# Patient Record
Sex: FEMALE | Race: WHITE | URBAN - METROPOLITAN AREA
[De-identification: names, ages, dates, MRNs, and addresses within clinical notes are randomized per-mention and may not be internally consistent; named-entity substitution may affect disease eponyms.]

---

## 2017-04-25 ENCOUNTER — OUTPATIENT (OUTPATIENT)
Dept: OUTPATIENT SERVICES | Facility: HOSPITAL | Age: 72
LOS: 1 days | Discharge: HOME | End: 2017-04-25

## 2017-04-28 DIAGNOSIS — M81.0 AGE-RELATED OSTEOPOROSIS WITHOUT CURRENT PATHOLOGICAL FRACTURE: ICD-10-CM

## 2017-04-28 DIAGNOSIS — I10 ESSENTIAL (PRIMARY) HYPERTENSION: ICD-10-CM

## 2017-04-28 DIAGNOSIS — I25.10 ATHEROSCLEROTIC HEART DISEASE OF NATIVE CORONARY ARTERY WITHOUT ANGINA PECTORIS: ICD-10-CM

## 2017-04-28 DIAGNOSIS — G43.909 MIGRAINE, UNSPECIFIED, NOT INTRACTABLE, WITHOUT STATUS MIGRAINOSUS: ICD-10-CM

## 2017-04-28 DIAGNOSIS — Z87.891 PERSONAL HISTORY OF NICOTINE DEPENDENCE: ICD-10-CM

## 2017-04-28 DIAGNOSIS — K57.30 DIVERTICULOSIS OF LARGE INTESTINE WITHOUT PERFORATION OR ABSCESS WITHOUT BLEEDING: ICD-10-CM

## 2017-04-28 DIAGNOSIS — Z88.0 ALLERGY STATUS TO PENICILLIN: ICD-10-CM

## 2017-04-28 DIAGNOSIS — E11.9 TYPE 2 DIABETES MELLITUS WITHOUT COMPLICATIONS: ICD-10-CM

## 2017-04-28 DIAGNOSIS — H04.411 CHRONIC DACRYOCYSTITIS OF RIGHT LACRIMAL PASSAGE: ICD-10-CM

## 2017-04-28 DIAGNOSIS — Z88.7 ALLERGY STATUS TO SERUM AND VACCINE: ICD-10-CM

## 2017-04-28 DIAGNOSIS — E03.9 HYPOTHYROIDISM, UNSPECIFIED: ICD-10-CM

## 2017-04-28 LAB — SURGICAL PATHOLOGY STUDY: SIGNIFICANT CHANGE UP

## 2018-02-15 ENCOUNTER — HOSPITAL ENCOUNTER (OUTPATIENT)
Dept: HOSPITAL 74 - FER | Age: 73
Setting detail: OBSERVATION
LOS: 1 days | Discharge: HOME | End: 2018-02-16
Attending: NURSE PRACTITIONER | Admitting: INTERNAL MEDICINE
Payer: COMMERCIAL

## 2018-02-15 VITALS — BODY MASS INDEX: 24.5 KG/M2

## 2018-02-15 DIAGNOSIS — E11.9: ICD-10-CM

## 2018-02-15 DIAGNOSIS — Z91.013: ICD-10-CM

## 2018-02-15 DIAGNOSIS — Z88.0: ICD-10-CM

## 2018-02-15 DIAGNOSIS — E03.9: ICD-10-CM

## 2018-02-15 DIAGNOSIS — I47.1: Primary | ICD-10-CM

## 2018-02-15 DIAGNOSIS — E78.5: ICD-10-CM

## 2018-02-15 DIAGNOSIS — R77.8: ICD-10-CM

## 2018-02-15 DIAGNOSIS — I10: ICD-10-CM

## 2018-02-15 LAB
ALBUMIN SERPL-MCNC: 4.3 G/DL (ref 3.5–5)
ALP SERPL-CCNC: 81 U/L (ref 32–92)
ALT SERPL-CCNC: 47 U/L (ref 10–40)
ANION GAP SERPL CALC-SCNC: 7 MMOL/L (ref 8–16)
AST SERPL-CCNC: 42 U/L (ref 10–42)
BASOPHILS # BLD: 0.4 % (ref 0–2)
BILIRUB SERPL-MCNC: 0.8 MG/DL (ref 0.2–1)
BUN SERPL-MCNC: 16 MG/DL (ref 7–18)
CALCIUM SERPL-MCNC: 8.8 MG/DL (ref 8.4–10.2)
CHLORIDE SERPL-SCNC: 101 MMOL/L (ref 98–107)
CO2 SERPL-SCNC: 28 MMOL/L (ref 22–28)
CREAT SERPL-MCNC: 0.8 MG/DL (ref 0.6–1.3)
DEPRECATED RDW RBC AUTO: 12.6 % (ref 11.6–15.6)
EOSINOPHIL # BLD: 1.6 % (ref 0–4.5)
GLUCOSE SERPL-MCNC: 118 MG/DL (ref 74–106)
HCT VFR BLD CALC: 40.6 % (ref 32.4–45.2)
HGB BLD-MCNC: 14.2 GM/DL (ref 10.7–15.3)
LYMPHOCYTES # BLD: 25.8 % (ref 8–40)
MCH RBC QN AUTO: 31.9 PG (ref 25.7–33.7)
MCHC RBC AUTO-ENTMCNC: 35 G/DL (ref 32–36)
MCV RBC: 91 FL (ref 80–96)
MONOCYTES # BLD AUTO: 6.7 % (ref 3.8–10.2)
NEUTROPHILS # BLD: 65.5 % (ref 42.8–82.8)
PLATELET # BLD AUTO: 263 K/MM3 (ref 134–434)
PMV BLD: 8.7 FL (ref 7.5–11.1)
POTASSIUM SERPLBLD-SCNC: 3.3 MMOL/L (ref 3.5–5.1)
PROT SERPL-MCNC: 7.4 G/DL (ref 6.4–8.3)
RBC # BLD AUTO: 4.46 M/MM3 (ref 3.6–5.2)
SODIUM SERPL-SCNC: 136 MMOL/L (ref 136–145)
WBC # BLD AUTO: 10.3 K/MM3 (ref 4–10.8)

## 2018-02-15 PROCEDURE — G0378 HOSPITAL OBSERVATION PER HR: HCPCS

## 2018-02-15 NOTE — HP
CHIEF COMPLAINT: rapid heart beat





PCP: Kiersten





HISTORY OF PRESENT ILLNESS:


This is a 72 year old female with a past medical history significant for SVT 

and hypothyroidism who presented to the ED with rapid heart beat. Pt reports 

that when she awoke this morning she wasn;t feeling well; she reports nausea, 

general malaise, neck and shoulder pain, elbow and hand achiness. She 

attributed the pain to working on her computer more over the past 2 days and 

decided she needed some exercise. She decided to walk to the [employer] but when 

she got to the library her heart started racing. SHe also reports that her BP 

was unusually high in the 150s this morning and she realized she had not taken 

her diltiazem over the past 2 days as she had "set it aside to take later, but 

later never came". She took her diltiazem and her quinapril 20/HCTZ this am. 

She reports she missed her synthroid this am. She denies any chest pain.





ER course was notable for:


(1) ECG with SVT, given adenosine 6mg with conversion to SR


(2) second troponin 0.10





Recent Travel:


pt denies





PAST MEDICAL HISTORY:


HTN, HLD, DM, SVT, hypothyroidism


PAST SURGICAL HISTORY:


tear duct surgery, thyroidectomy ,  x 2





Social History:


Smoking: pt denies


Alcohol: occ-1xmonth maybe


Drugs: pt denies





Family History:


mother  age 86, multiple CVAs


father  age 93, asthma, DM2, prostate CA


1 sister s/p PPM


1 sister lupus arthritis





Allergies


Fish Containing Products Allergy (Verified 02/15/18 13:53)


Penicillins Allergy (Verified 02/15/18 13:53)


 


HOME MEDICATIONS:


 3





 Medication  Instructions  Recorded


 


Diltiazem [Cardizem -] 120 mg PO DAILY 02/15/18


 


Levothyroxine [Synthroid -] 125 mcg PO DAILY 02/15/18


 


Quinapril HCl 20 mg PO [facility] 02/15/18


 


Quinapril/Hydrochlorothiazide 1 each PO [facility] 02/15/18





[Accuretic 20-12.5 mg Tablet]  








REVIEW OF SYSTEMS


CONSTITUTIONAL: 


Absent:  fever, chills, diaphoresis, generalized weakness, malaise, loss of 

appetite, weight change


HEENT: 


Absent:  rhinorrhea, nasal congestion, throat pain, throat swelling, difficulty 

swallowing, mouth swelling, ear pain, eye pain, visual changes


CARDIOVASCULAR: Present: palpitations, rapid heart beat


Absent: chest pain, syncope, irregular heart rate, lightheadedness, peripheral 

edema


RESPIRATORY: 


Absent: cough, shortness of breath, dyspnea with exertion, orthopnea, wheezing, 

stridor, hemoptysis


GASTROINTESTINAL: Present: nausea


Absent: abdominal pain, abdominal distension, vomiting, diarrhea, constipation, 

melena, hematochezia


GENITOURINARY: 


Absent: dysuria, frequency, urgency, hesitancy, hematuria, flank pain, genital 

pain


MUSCULOSKELETAL: Present: shoulder pain, neck pain, elbow pain and hand pain


Absent: myalgia, arthralgia, joint swelling, back pain, neck pain


SKIN: 


Absent: rash, itching, pallor


HEMATOLOGIC/IMMUNOLOGIC: 


Absent: easy bleeding, easy bruising, lymphadenopathy, frequent infections


ENDOCRINE:


Absent: unexplained weight gain, unexplained weight loss, heat intolerance, 

cold intolerance


NEUROLOGIC: 


Absent: headache, focal weakness or paresthesias, dizziness, unsteady gait, 

seizure, mental status changes, bladder or bowel incontinence


PSYCHIATRIC: 


Absent: anxiety, depression, suicidal or homicidal ideation, hallucinations.








PHYSICAL EXAMINATION


Vital Signs - 24 hr





 3





  02/15/18 02/15/18 02/15/18





  13:38 13:59 14:36


 


Temperature 97.2 F L  97.2 F L


 


Pulse Rate 192 H  


 


Pulse Rate [  103 H 98 H





Right]   


 


Respiratory 20 20 20





Rate   


 


Blood Pressure 164/112  


 


Blood Pressure  159/84 129/77





[Left Arm]   


 


O2 Sat by Pulse 97 100 97





Oximetry (%)   








 3





  02/15/18 02/15/18 02/15/18





  15:33 15:54 18:12


 


Temperature 98 F  


 


Pulse Rate   


 


Pulse Rate [ 93 H 89 72





Right]   


 


Respiratory 20 20 20





Rate   


 


Blood Pressure   


 


Blood Pressure 123/72 121/74 133/64





[Left Arm]   


 


O2 Sat by Pulse 99 98 98





Oximetry (%)   











GENERAL: Awake, alert, and fully oriented, in no acute distress.


HEAD: Normal with no signs of trauma.


EYES: Pupils equal, round and reactive to light, extraocular movements intact, 

sclera anicteric, conjunctiva clear. No lid lag.


EARS, NOSE, THROAT: Ears normal, nares patent, oropharynx clear without 

exudates. Moist mucous membranes.


NECK: Normal range of motion, supple without lymphadenopathy, JVD, or masses.


LUNGS: Breath sounds equal, clear to auscultation bilaterally. No wheezes, and 

no crackles. No accessory muscle use.


HEART: Regular rate and rhythm, normal S1 and S2 without murmur, rub or gallop.


ABDOMEN: Soft, nontender, not distended, normoactive bowel sounds, no guarding, 

no rebound, no masses.  No hepatomegaly or  splenomegaly. 


MUSCULOSKELETAL: Normal range of motion at all joints. No bony deformities or 

tenderness. No CVA tenderness.


UPPER EXTREMITIES: 2+ pulses, warm, well-perfused. No cyanosis. No clubbing. No 

peripheral edema.


LOWER EXTREMITIES: 2+ pulses, warm, well-perfused. No calf tenderness. No 

peripheral edema. 


NEUROLOGICAL:  Cranial nerves II-XII intact. Normal speech. Normal gait.


PSYCHIATRIC: Cooperative. Good eye contact. Appropriate mood and affect.


SKIN: Warm, dry, normal turgor, no rashes or lesions noted, normal capillary 

refill. 


 


Laboratory Results - last 24 hr





 3





  02/15/18 02/15/18 02/15/18 02/15/18 02/15/18





  13:42 13:46 13:46 16:52 17:01


 


WBC    10.3  


 


RBC    4.46  


 


Hgb    14.2  


 


Hct    40.6  


 


MCV    91.0  


 


MCH    31.9  


 


MCHC    35.0  


 


RDW    12.6  


 


Plt Count    263  


 


MPV    8.7  


 


Neutrophils %    65.5  


 


Lymphocytes %    25.8  


 


Monocytes %    6.7  


 


Eosinophils %    1.6  


 


Basophils %    0.4  


 


Sodium   136   


 


Potassium   3.3 L   


 


Chloride   101   


 


Carbon Dioxide   28   


 


Anion Gap   7 L   


 


BUN   16   


 


Creatinine   0.8   


 


Creat Clearance w eGFR   > 60   


 


Random Glucose   118 H   


 


Calcium   8.8   


 


Magnesium     1.8 


 


Total Bilirubin   0.8   


 


AST   42   


 


ALT   47 H   


 


Alkaline Phosphatase   81   


 


Troponin I  < 0.03     0.10 H


 


Total Protein   7.4   


 


Albumin   4.3   








EC/15/18 13:36


SVT


Vent rate 186 


Marked ST abnormality, possible inferolateral subendocardial injury





2/15/18 14:00


Sinus tachycardia with occ PVC


Vent rate 105, 


No acute ST/T wave changes present





Radiology Reports


CXR


IMPRESSION: No acute disease. Reported By: Yoel Villatoro MD 02/15/18 [id number] 








ASSESSMENT/PLAN:


72yF with PMH HTN, HLD, SVT, DM, hypothyroidism presented to the ED with rapid 

heart beat.





SVT


   - broke with adenosine 6mg


   - likely due to pt missing doses of diltiazem


   - resume home diltiazem and increase dose to 120mg daily as per ED as per PCP


   - monitor on tele


Elevated troponin


   - likely due to demand ischemia during SVT


   - trend troponin


   - ASA given in ED





HLD


   - not on meds, f/u with PCP





hypothyroidism


   - check TSH





DM


   - pt states she is diet controlled. Check A1C





DVT PPX


   - deferred, anticipated LOS <48h





FEN


   - tolerating po


   - BMP in am


   - low sodium diet in am





Dispo: Pt requires cardiac monitoring overnight. May go home in am for 

outpatient follow up if Troponin trends down








Visit type





- Emergency Visit


Emergency Visit: Yes


ED Registration Date: 02/15/18


Care time: The patient presented to the Emergency Department on the above date 

and was hospitalized for further evaluation of their emergent condition.





- New Patient


This patient is new to me today: Yes


Date on this admission: 02/15/18





- Critical Care


Critical Care patient: No

## 2018-02-16 VITALS — SYSTOLIC BLOOD PRESSURE: 133 MMHG | DIASTOLIC BLOOD PRESSURE: 58 MMHG | HEART RATE: 60 BPM | TEMPERATURE: 98.8 F

## 2018-02-16 LAB
ANION GAP SERPL CALC-SCNC: 8 MMOL/L (ref 8–16)
BASOPHILS # BLD: 0.6 % (ref 0–2)
BUN SERPL-MCNC: 14 MG/DL (ref 7–18)
CALCIUM SERPL-MCNC: 8.9 MG/DL (ref 8.4–10.2)
CHLORIDE SERPL-SCNC: 105 MMOL/L (ref 98–107)
CO2 SERPL-SCNC: 26 MMOL/L (ref 22–28)
CREAT SERPL-MCNC: < 0.8 MG/DL (ref 0.6–1.3)
DEPRECATED RDW RBC AUTO: 12.7 % (ref 11.6–15.6)
EOSINOPHIL # BLD: 2.1 % (ref 0–4.5)
GLUCOSE SERPL-MCNC: 125 MG/DL (ref 74–106)
HCT VFR BLD CALC: 37.7 % (ref 32.4–45.2)
HGB BLD-MCNC: 13.2 GM/DL (ref 10.7–15.3)
LYMPHOCYTES # BLD: 29.9 % (ref 8–40)
MAGNESIUM SERPL-MCNC: 1.9 MG/DL (ref 1.8–2.4)
MCH RBC QN AUTO: 32 PG (ref 25.7–33.7)
MCHC RBC AUTO-ENTMCNC: 35 G/DL (ref 32–36)
MCV RBC: 91.5 FL (ref 80–96)
MONOCYTES # BLD AUTO: 6.8 % (ref 3.8–10.2)
NEUTROPHILS # BLD: 60.6 % (ref 42.8–82.8)
PHOSPHATE SERPL-MCNC: 3.5 MG/DL (ref 2.5–4.6)
PLATELET # BLD AUTO: 236 K/MM3 (ref 134–434)
PMV BLD: 8.4 FL (ref 7.5–11.1)
POTASSIUM SERPLBLD-SCNC: 3.7 MMOL/L (ref 3.5–5.1)
RBC # BLD AUTO: 4.12 M/MM3 (ref 3.6–5.2)
SODIUM SERPL-SCNC: 139 MMOL/L (ref 136–145)
WBC # BLD AUTO: 6.8 K/MM3 (ref 4–10.8)

## 2018-02-16 PROCEDURE — 3E033GC INTRODUCTION OF OTHER THERAPEUTIC SUBSTANCE INTO PERIPHERAL VEIN, PERCUTANEOUS APPROACH: ICD-10-PCS | Performed by: NURSE PRACTITIONER

## 2018-02-16 NOTE — CON.CARD
Consult


Consult Specialty:: Cardiology


Referred by:: Addie Ford NP


Reason for Consultation:: Paroxysmal SVT





- History of Present Illness


Chief Complaint: SVT


History of Present Illness: 


73 yo female with HTN, h/o paroxysmal SVT, and hypothyroidism who presented to 

the ED with palpitations which began yesterday morning. She had reported 

dealing with an "illness" over the past several weeks and had developed nausea, 

malaise, body myalgias including shoulder and neck pain which she attributed to 

working on her computer over the past several days. She took a walk outside 

yesterday and developed sudden onset of palpitations with associated bilateral 

arm discomfort. She reports missing her diltiazem over the past several days. 

In ED, she was found to be in SVT with HR 180s. She was given adenosine 6 mg IV 

x1 with conversion of SVT to sinus rhythm with improvement in her bilateral arm 

discomfort. Her initial trop was 0.10 -> 0.11 -> 0.04. Currently asymptomatic. 

Patient reports that her last stress test was ~ 2-3 years ago. 








- History Source


History Provided By: Patient, Family Member ()


Limitations to Obtaining History: No Limitations





- Past Medical History


Cardio/Vascular: Yes: HTN, Hyperlipdemia, Other (Paroxysmal SVT)


...Pregnant: No


Endocrine: Yes: Hypothyroidism





- Past Surgical History


Past Surgical History: Yes:  (x2)


Additional Surgical History: Throidectomy 2008, tear duct surgery





- Alcohol/Substance Use


Hx Alcohol Use: No





- Smoking History


Smoking history: Never smoked


Have you smoked in the past 12 months: No





Home Medications





- Allergies


Allergies/Adverse Reactions: 


 Allergies











Allergy/AdvReac Type Severity Reaction Status Date / Time


 


Fish Containing Products Allergy   Verified 02/15/18 13:53


 


Penicillins Allergy   Verified 02/15/18 13:53














- Home Medications


Home Medications: 


Ambulatory Orders





Diltiazem [Cardizem -] 120 mg PO DAILY 02/15/18 


Levothyroxine [Synthroid -] 125 mcg PO DAILY 02/15/18 


Quinapril HCl 20 mg PO [facility] 02/15/18 


Quinapril/Hydrochlorothiazide [Accuretic 20-12.5 mg Tablet] 1 each PO DAILY 02/

15/18 











Family Disease History





- Family Disease History


Family Disease History: Diabetes: Father (Prostate cancer), CA: Father, Other: 

Mother (CVAs), Sister (Lupus)





Review of Systems





- Review of Systems


Constitutional: reports: No Symptoms


Eyes: reports: No Symptoms


Cardiovascular: reports: Palpitations.  denies: Chest Pain, Edema, Shortness of 

Breath


Respiratory: reports: No Symptoms


Gastrointestinal: reports: No Symptoms


Genitourinary: reports: No Symptoms


Musculoskeletal: reports: Other (Shoulder muscle aches)


Neurological: reports: No Symptoms


Endocrine: reports: No Symptoms


Hematology/Lymphatic: reports: No Symptoms


Psychiatric: reports: No Symptoms


Vital Signs: 


 Vital Signs











Temperature  97.7 F   18 09:18


 


Pulse Rate  67   18 09:18


 


Respiratory Rate  18   18 09:18


 


Blood Pressure  186/67   18 09:18


 


O2 Sat by Pulse Oximetry (%)  98   18 06:11











Constitutional: Yes: Well Nourished, No Distress


Eyes: Yes: Conjunctiva Clear, EOM Intact


HENT: Yes: Atraumatic, Normocephalic


Respiratory: Yes: CTA Bilaterally


Gastrointestinal: Yes: Normal Bowel Sounds, Soft.  No: Tenderness


Cardiovascular: Yes: Regular Rate and Rhythm


JVD: No


Carotid Bruit: No


PMI: Non-Displaced


Heart Sounds: Yes: S1, S2


Murmur: No: Systolic Murmur


Extremities: Yes: WNL


Edema: No


Peripheral Pulses WNL: Yes


Neurological: Yes: WNL, Alert, Oriented, Cran Nerves II-XII Intact


Psychiatric: Yes: WNL





- Other Data


Labs, Other Data: 


 CBC, BMP





 18 07:00 





 18 07:00 





 Troponin, BNP











  02/15/18 02/15/18 02/15/18





  13:42 13:46 17:01


 


Troponin I  < 0.03  Cancelled  0.10 H














  18





  00:20 00:21 07:00


 


Troponin I  0.11 H  Cancelled  0.04








 Troponin, BNP











  02/15/18 02/15/18 02/15/18





  13:42 13:46 17:01


 


Troponin I  < 0.03  Cancelled  0.10 H














  18





  00:20 00:21 07:00


 


Troponin I  0.11 H  Cancelled  0.04














18 ECG: Sinus bradycardia, rate 53 bpm





Imaging





- Results


Chest X-ray: Report Reviewed (2/15/18: No acute disease), Image Reviewed





Assessment/Plan


73 yo female with HTN, paroxysmal SVT, and hypothyroidism who presented to the 

ED yesterday with paroxysmal SVT with HR 180s.


Converted to sinus with 6 mg of IV adenosine with resolution of her associated 

bilateral arm discomfort.


Her initial trop was 0.10 -> 0.11 -> 0.04. 


TSH pending





Currently asymptomatic.





RECS:


Patient was instructed to continue her diltiazem and to ensure she does not 

miss any doses.


Continue her 


Patient to follow-up with her private cardiologist and would benefit from 

echocardiogram (if not done recently) and stress testing to evaluate for 

ischemic heart disease given reported bilateral arm discomfort with her SVT.


Patient may be discharged from cardiac standpoint. No further inpatient cardiac 

evaluation/intervention is clinically indicated at this time.


Will see prn. Please call with questions.

## 2018-02-16 NOTE — PN
Progress Note, Physician


Chief Complaint: 





Paroxysmal SVT





- Current Medication List


Current Medications: 


Active Medications





Diltiazem HCl (Cardizem Cd -)  120 mg PO DAILY [facility]


   Last Admin: 02/16/18 09:23 Dose:  120 mg


Hydrochlorothiazide (Hctz -)  12.5 mg PO DAILY [facility]


   Last Admin: 02/16/18 09:23 Dose:  12.5 mg


Levothyroxine Sodium (Synthroid -)  125 mcg PO DAILY@0700 [facility]


   Last Admin: 02/16/18 06:35 Dose:  125 mcg


Quinapril HCl (Accupril -)  20 mg PO DAILY [facility]


   Last Admin: 02/16/18 09:23 Dose:  20 mg











- Objective


Vital Signs: 


 Vital Signs











Temperature  97.7 F   02/16/18 09:18


 


Pulse Rate  67   02/16/18 09:18


 


Respiratory Rate  18   02/16/18 09:18


 


Blood Pressure  186/67   02/16/18 09:18


 


O2 Sat by Pulse Oximetry (%)  98   02/16/18 06:11











Labs: 


 CBC, BMP





 02/16/18 07:00 





 02/16/18 07:00

## 2018-02-16 NOTE — DS
Physical Exam: 


SUBJECTIVE: Patient seen and examined








OBJECTIVE:





 Vital Signs











 Period  Temp  Pulse  Resp  BP Sys/Mariano  Pulse Ox


 


 Last 24 Hr  97.2 F-98.1 F  61-98  16-20  104-186/59-77  








PHYSICAL EXAM





GENERAL: The patient is awake, alert, and fully oriented, in no acute distress.


HEAD: Normal with no signs of trauma.


EYES: PERRL, extraocular movements intact, sclera anicteric, conjunctiva clear. 


ENT: Ears normal, nares patent, oropharynx clear without exudates, moist mucous 

membranes.


NECK: Trachea midline, full range of motion, supple. 


LUNGS: Breath sounds equal, clear to auscultation bilaterally, no wheezes, no 

crackles, no accessory muscle use. 


HEART: Regular rate and rhythm, S1, S2 without murmur, rub or gallop.


ABDOMEN: Soft, nontender, nondistended, normoactive bowel sounds, no guarding, 

no rebound, no hepatosplenomegaly, no masses.


EXTREMITIES: 2+ pulses, warm, well-perfused, no edema. 


NEUROLOGICAL: Cranial nerves II through XII grossly intact. Normal speech, gait 

not observed.


PSYCH: Normal mood, normal affect.


SKIN: Warm, dry, normal turgor, no rashes or lesions noted.





LABS


 Laboratory Results - last 24 hr











  02/15/18 02/15/18 02/15/18





  13:42 13:42 13:46


 


WBC   


 


RBC   


 


Hgb   


 


Hct   


 


MCV   


 


MCH   


 


MCHC   


 


RDW   


 


Plt Count   


 


MPV   


 


Neutrophils %   


 


Lymphocytes %   


 


Monocytes %   


 


Eosinophils %   


 


Basophils %   


 


Sodium    136


 


Potassium    3.3 L


 


Chloride    101


 


Carbon Dioxide    28


 


Anion Gap    7 L


 


BUN    16


 


Creatinine    0.8


 


Creat Clearance w eGFR    > 60


 


Random Glucose    118 H


 


Hemoglobin A1c %   


 


Lactic Acid  Cancelled  


 


Calcium    8.8


 


Phosphorus   


 


Magnesium   


 


Total Bilirubin    0.8


 


AST    42


 


ALT    47 H


 


Alkaline Phosphatase    81


 


Creatine Kinase   


 


Troponin I   < 0.03 


 


Total Protein    7.4


 


Albumin    4.3














  02/15/18 02/15/18 02/15/18





  13:46 13:46 16:52


 


WBC  10.3  


 


RBC  4.46  


 


Hgb  14.2  


 


Hct  40.6  


 


MCV  91.0  


 


MCH  31.9  


 


MCHC  35.0  


 


RDW  12.6  


 


Plt Count  263  


 


MPV  8.7  


 


Neutrophils %  65.5  


 


Lymphocytes %  25.8  


 


Monocytes %  6.7  


 


Eosinophils %  1.6  


 


Basophils %  0.4  


 


Sodium   


 


Potassium   


 


Chloride   


 


Carbon Dioxide   


 


Anion Gap   


 


BUN   


 


Creatinine   


 


Creat Clearance w eGFR   


 


Random Glucose   


 


Hemoglobin A1c %   


 


Lactic Acid   


 


Calcium   


 


Phosphorus   


 


Magnesium    1.8


 


Total Bilirubin   


 


AST   


 


ALT   


 


Alkaline Phosphatase   


 


Creatine Kinase   


 


Troponin I   Cancelled 


 


Total Protein   


 


Albumin   














  02/15/18 02/16/18 02/16/18





  17:01 00:20 00:21


 


WBC   


 


RBC   


 


Hgb   


 


Hct   


 


MCV   


 


MCH   


 


MCHC   


 


RDW   


 


Plt Count   


 


MPV   


 


Neutrophils %   


 


Lymphocytes %   


 


Monocytes %   


 


Eosinophils %   


 


Basophils %   


 


Sodium   


 


Potassium   


 


Chloride   


 


Carbon Dioxide   


 


Anion Gap   


 


BUN   


 


Creatinine   


 


Creat Clearance w eGFR   


 


Random Glucose   


 


Hemoglobin A1c %   


 


Lactic Acid   


 


Calcium   


 


Phosphorus   


 


Magnesium   


 


Total Bilirubin   


 


AST   


 


ALT   


 


Alkaline Phosphatase   


 


Creatine Kinase   83 


 


Troponin I  0.10 H  0.11 H  Cancelled


 


Total Protein   


 


Albumin   














  02/16/18 02/16/18 02/16/18





  00:21 07:00 07:00


 


WBC   6.8  D 


 


RBC   4.12 


 


Hgb   13.2 


 


Hct   37.7 


 


MCV   91.5 


 


MCH   32.0 


 


MCHC   35.0 


 


RDW   12.7 


 


Plt Count   236 


 


MPV   8.4 


 


Neutrophils %   60.6 


 


Lymphocytes %   29.9 


 


Monocytes %   6.8 


 


Eosinophils %   2.1 


 


Basophils %   0.6 


 


Sodium    139


 


Potassium    3.7


 


Chloride    105


 


Carbon Dioxide    26


 


Anion Gap    8


 


BUN    14


 


Creatinine    < 0.8


 


Creat Clearance w eGFR   


 


Random Glucose    125 H


 


Hemoglobin A1c %   


 


Lactic Acid   


 


Calcium    8.9


 


Phosphorus    3.5


 


Magnesium    1.9


 


Total Bilirubin   


 


AST   


 


ALT   


 


Alkaline Phosphatase   


 


Creatine Kinase  Cancelled  


 


Troponin I   


 


Total Protein   


 


Albumin   














  02/16/18 02/16/18 02/16/18





  07:00 07:00 07:00


 


WBC   


 


RBC   


 


Hgb   


 


Hct   


 


MCV   


 


MCH   


 


MCHC   


 


RDW   


 


Plt Count   


 


MPV   


 


Neutrophils %   


 


Lymphocytes %   


 


Monocytes %   


 


Eosinophils %   


 


Basophils %   


 


Sodium   


 


Potassium   


 


Chloride   


 


Carbon Dioxide   


 


Anion Gap   


 


BUN   


 


Creatinine   


 


Creat Clearance w eGFR   


 


Random Glucose   


 


Hemoglobin A1c %  5.9  


 


Lactic Acid   


 


Calcium   


 


Phosphorus   


 


Magnesium   


 


Total Bilirubin   


 


AST   


 


ALT   


 


Alkaline Phosphatase   


 


Creatine Kinase    75


 


Troponin I   0.04 


 


Total Protein   


 


Albumin   











HOSPITAL COURSE:





Date of Admission:02/16/18





Date of Discharge: 02/16/18








SVT


non-compliant


Minutes to complete discharge: 35





Discharge Summary


Reason For Visit: FAST HEART BEAT


Current Active Problems





Elevated troponin (Acute)


SVT (supraventricular tachycardia) (Acute)








Condition: Improved





- Instructions


Diet, Activity, Other Instructions: 


A prescription has been sent to your pharmacy for Cardizem CD 120mg. Take this 

medication as directed.





It is very important you take all your prescribed medications as directed. 





Please follow up with your cardiologist Dr. Nahun Burch early next week. 

Advise him of your stay in the hospital. He may wish to do further testing. 





Return to the emergency department for any new or worsening symptoms.


Referrals: 


Nahun Burch [Non Staff, Medical] - 


Disposition: HOME





- Home Medications


Comprehensive Discharge Medication List: 


Ambulatory Orders





Diltiazem [Cardizem -] 120 mg PO DAILY 02/15/18 


Levothyroxine [Synthroid -] 125 mcg PO DAILY 02/15/18 


Quinapril HCl 20 mg PO ASDIR 02/15/18 


Quinapril/Hydrochlorothiazide [Accuretic 20-12.5 mg Tablet] 1 each PO DAILY 02/

15/18 








This patient is new to me today: Yes


Date on this admission: 02/16/18


Emergency Visit: Yes


ED Registration Date: 02/16/18


Care time: The patient presented to the Emergency Department on the above date 

and was hospitalized for further evaluation of their emergent condition.


Critical Care patient: No





- Discharge Referral


Referred to [facility] No

## 2018-02-19 NOTE — EKG
Test Reason : 

Blood Pressure : ***/*** mmHG

Vent. Rate : 186 BPM     Atrial Rate : 163 BPM

   P-R Int : 000 ms          QRS Dur : 092 ms

    QT Int : 246 ms       P-R-T Axes : 000 011 161 degrees

   QTc Int : 432 ms

 

SUPRAVENTRICULAR TACHYCARDIA

ST depression in I, aVL, inferior leads, and V4-6, consider ischemia

ABNORMAL ECG

NO PREVIOUS ECGS AVAILABLE

Confirmed by KAI CLAY MD ([id number]) on 2/19/2018 7:54:07 PM

 

Referred By: YAHIR HORTON           Confirmed By:KAI CLAY MD

## 2018-02-21 NOTE — EKG
Test Reason : 

Blood Pressure : ***/*** mmHG

Vent. Rate : 105 BPM     Atrial Rate : 105 BPM

   P-R Int : 202 ms          QRS Dur : 074 ms

    QT Int : 318 ms       P-R-T Axes : 070 021 057 degrees

   QTc Int : 420 ms

 

SINUS TACHYCARDIA WITH OCCASIONAL PREMATURE VENTRICULAR COMPLEXES

LEFT ATRIAL ENLARGEMENT

WHEN COMPARED WITH ECG OF 15-FEB-2018 13:36,

Sinus rhythm has replaced SVT

PREMATURE VENTRICULAR COMPLEXES ARE NOW PRESENT

VENT. RATE HAS DECREASED BY  81 BPM

ST NO LONGER DEPRESSED IN INFERIOR LEADS

ST LESS DEPRESSED IN LATERAL LEADS

Confirmed by DANNA RODRIGUEZ, KAI ([id number]) on 2/21/2018 12:10:59 PM

 

Referred By: YAHIR HORTON           Confirmed By:KAI CLAY MD

## 2018-10-16 ENCOUNTER — OUTPATIENT (OUTPATIENT)
Dept: OUTPATIENT SERVICES | Facility: HOSPITAL | Age: 73
LOS: 1 days | Discharge: HOME | End: 2018-10-16

## 2018-10-16 LAB — GLUCOSE BLDC GLUCOMTR-MCNC: 149 MG/DL — HIGH (ref 70–99)

## 2021-09-01 NOTE — PDOC
History of Present Illness





- General


History Source: Patient


Exam Limitations: No Limitations





- History of Present Illness


Initial Comments: 





02/15/18 14:24





The patient is a 72 year old female with history of hypertension, hyperlipidemia

, DM, past SVT who presents to the ED complaining of diffuse back, neck, and 

chest pain that began this morning. The patient states her pain is constant, 

moderate in intensity, radiating to the elbows bilaterally, with no alleviating 

or exacerbating factors. She also reports associated nausea. No fever or 

chills. No headache or blurred vision. EKG on ED arrival demonstrated SVT. The 

patient does endorse having


Cardiologist: Dr. Burch, [phone]





<Dasha Ashby - Last Filed: 02/15/18 15:59>





<Moe Jacobson - Last Filed: 02/15/18 19:07>





- General


Chief Complaint: Irregular Heart Beat


Stated Complaint: FAST HEART BEAT


Time Seen by Provider: 02/15/18 13:40





Past History





<Dasha Ashby - Last Filed: 02/15/18 15:59>





<Moe Jacobson - Last Filed: 02/15/18 19:07>





- Past Medical History


Allergies/Adverse Reactions: 


 Allergies











Allergy/AdvReac Type Severity Reaction Status Date / Time


 


Fish Containing Products Allergy   Verified 02/15/18 13:53


 


Penicillins Allergy   Verified 02/15/18 13:53











Home Medications: 


Ambulatory Orders





Diltiazem [Cardizem -] 120 mg PO DAILY 02/15/18 


Levothyroxine [Synthroid -] 125 mcg PO DAILY 02/15/18 


Quinapril HCl 20 mg PO ASDIR 02/15/18 


Quinapril/Hydrochlorothiazide [Accuretic 20-12.5 mg Tablet] 1 each PO DAILY 02/

15/18 











**Review of Systems





- Review of Systems


Able to Perform ROS?: Yes


Comments:: 





02/15/18 14:29


A complete review of 10 out of 10 review of systems is taken and is negative 

apart from what is previously mentioned below and in the HPI.








<Dasha Ashby - Last Filed: 02/15/18 15:59>





*Physical Exam





- Vital Signs


 Last Vital Signs











Temp Pulse Resp BP Pulse Ox


 


    103 H  20   159/84   100 


 


    02/15/18 13:59  02/15/18 13:59  02/15/18 13:59  02/15/18 13:59














- Physical Exam


Comments: 





02/15/18 14:30


Vitals: Triage vital signs reviewed


General Appearance: No acute distress, well nourished, well developed


Head: Atraumatic


Neck:  Supple; No nuchal rigidity


Chest Wall: Nontender


Cardiac: Regular rate and rhythm, no murmurs, no rubs, no gallops


Lungs: Clear to auscultation bilateral, good air movement bilaterally


Abdomen:  Soft, nondistended, normal bowel sounds, nontender to palpation


Extremities: Full range of motion to all extremities, no cyanosis, clubbing, or 

edema


Skin:  Warm and dry, no rashes or lesions, no rash, no petechiae


Neuro:  AOX3; Cranial Nerves 2-12 grossly intact, Strength intact to all 

extremities, Sensation intact to all extremities, gait normal


Psych: Normal mood, normal affect








<Dasha Ashby - Last Filed: 02/15/18 15:59>





ED Treatment Course





- LABORATORY


CBC & Chemistry Diagram: 


 02/15/18 13:46





 02/15/18 13:46





- ADDITIONAL ORDERS


Additional order review: 


 











  02/15/18





  13:46


 


RBC  4.46


 


MCV  91.0


 


MCHC  35.0


 


RDW  12.6


 


MPV  8.7


 


Neutrophils %  65.5


 


Lymphocytes %  25.8


 


Monocytes %  6.7


 


Eosinophils %  1.6


 


Basophils %  0.4














<Dasha Ashby - Last Filed: 02/15/18 15:59>





- LABORATORY


CBC & Chemistry Diagram: 


 02/15/18 13:46





 02/15/18 13:46





<Moe Jacobson - Last Filed: 02/15/18 19:07>





Medical Decision Making





- Medical Decision Making





02/15/18 15:05


72 year old female with history of hypertension, hyperlipidemia, diabetes, past 

SVT here today for 1 day history of diffuse chest and torso pain and nausea. 

EKG on arrival demonstrates SVT.


Plan: 


-Lab (CBC, CMP, Lactic Acid, Trop)


-CXR


-EKG


-IVF








<Dasha Ashby - Last Filed: 02/15/18 15:59>





- Critical Care Time


Total Critical Care Time (minutes): 45


Critical Care Statement: The care of this patient involved high complexity 

decision making to prevent further life threatening deterioration of the patient

's condition and/or to evaluate & treat vital organ system(s) failure or risk 

of failure.





- Medical Decision Making








Medical decision making status post adenosine patient now in sinus rhythm no 

ischemic changes. All patient symptomatology including neck back shoulder arm 

discomfort have all resolved. Her repeat EKG demonstrates sinus rhythm with no 

ST elevations and no T-wave inversions





Labs pending





Reevaluation first troponin negative





Case discussed with patient's covering cardiologist Dr. Ribera agrees with plan 

of second troponin if no delta in her troponins we will increase her diltiazem 

from 60 mg to 120 mg and she be discharged home





Reevaluation patient remains chest pain-free however her second troponin has 

increased 2.1





Given this delta patient will require observation overnight for serial 

troponins. We'll give 325 mg aspirin. Will observe overnight.





Assessment was to management of care.








<Moe Jacobson - Last Filed: 02/15/18 19:07>





*DC/Admit/Observation/Transfer





- Attestations


Scribe Attestion: 





02/15/18 14:30





Documentation prepared by Dasha Ashby, acting as medical scribe for oMe Jacobson MD.





<Dasha Ashby - Last Filed: 02/15/18 15:59>





- Discharge Dispostion


Admit: Yes





<Moe Jacobson - Last Filed: 02/15/18 19:07>


Diagnosis at time of Disposition: 


 SVT (supraventricular tachycardia), Elevated troponin Render Risk Assessment In Note?: no Detail Level: Simple Comment: Discussed PRP treatment in detail with patient; patient will call to schedule if she wishes to move forward with therapy. Comment: Although patient improved with Halog solution, she finds that formula is too greasy and wishes to trial alternative topical.

## 2024-08-08 NOTE — EKG
Test Reason : 

Blood Pressure : ***/*** mmHG

Vent. Rate : 053 BPM     Atrial Rate : 053 BPM

   P-R Int : 178 ms          QRS Dur : 080 ms

    QT Int : 418 ms       P-R-T Axes : 065 008 022 degrees

   QTc Int : 392 ms

 

*** POOR DATA QUALITY, INTERPRETATION MAY BE ADVERSELY AFFECTED

SINUS BRADYCARDIA

OTHERWISE NORMAL ECG

NO PREVIOUS ECGS AVAILABLE

Confirmed by KAI CLAY MD ([id number]) on 2/16/2018 12:28:36 PM

 

Referred By:             Confirmed By:KAI CLAY MD Attending with This was a shared visit with the [facility]. I reviewed and verified the documentation.